# Patient Record
Sex: FEMALE | Race: WHITE | NOT HISPANIC OR LATINO | Employment: FULL TIME | URBAN - METROPOLITAN AREA
[De-identification: names, ages, dates, MRNs, and addresses within clinical notes are randomized per-mention and may not be internally consistent; named-entity substitution may affect disease eponyms.]

---

## 2019-08-05 ENCOUNTER — OFFICE VISIT (OUTPATIENT)
Dept: OBSTETRICS AND GYNECOLOGY | Facility: CLINIC | Age: 19
End: 2019-08-05

## 2019-08-05 ENCOUNTER — APPOINTMENT (OUTPATIENT)
Dept: LAB | Facility: HOSPITAL | Age: 19
End: 2019-08-05

## 2019-08-05 VITALS
DIASTOLIC BLOOD PRESSURE: 70 MMHG | WEIGHT: 106 LBS | BODY MASS INDEX: 19.51 KG/M2 | HEIGHT: 62 IN | SYSTOLIC BLOOD PRESSURE: 106 MMHG

## 2019-08-05 DIAGNOSIS — N92.6 IRREGULAR MENSES: ICD-10-CM

## 2019-08-05 DIAGNOSIS — Z01.419 ENCOUNTER FOR WELL WOMAN EXAM WITH ROUTINE GYNECOLOGICAL EXAM: Primary | ICD-10-CM

## 2019-08-05 DIAGNOSIS — R30.0 DYSURIA: ICD-10-CM

## 2019-08-05 DIAGNOSIS — Z30.432 ENCOUNTER FOR REMOVAL OF INTRAUTERINE CONTRACEPTIVE DEVICE (IUD): ICD-10-CM

## 2019-08-05 PROBLEM — L40.9 PSORIASIS: Status: ACTIVE | Noted: 2019-08-05

## 2019-08-05 LAB
BACTERIA UR QL AUTO: ABNORMAL /HPF
BILIRUB UR QL STRIP: NEGATIVE
CLARITY UR: ABNORMAL
COLOR UR: YELLOW
GLUCOSE UR STRIP-MCNC: NEGATIVE MG/DL
HGB UR QL STRIP.AUTO: ABNORMAL
HYALINE CASTS UR QL AUTO: ABNORMAL /LPF
KETONES UR QL STRIP: NEGATIVE
LEUKOCYTE ESTERASE UR QL STRIP.AUTO: ABNORMAL
NITRITE UR QL STRIP: NEGATIVE
PH UR STRIP.AUTO: 6 [PH] (ref 5–8)
PROT UR QL STRIP: ABNORMAL
RBC # UR: ABNORMAL /HPF
REF LAB TEST METHOD: ABNORMAL
SP GR UR STRIP: 1.02 (ref 1–1.03)
SQUAMOUS #/AREA URNS HPF: ABNORMAL /HPF
UROBILINOGEN UR QL STRIP: ABNORMAL
WBC UR QL AUTO: ABNORMAL /HPF

## 2019-08-05 PROCEDURE — 87210 SMEAR WET MOUNT SALINE/INK: CPT | Performed by: OBSTETRICS & GYNECOLOGY

## 2019-08-05 PROCEDURE — 81001 URINALYSIS AUTO W/SCOPE: CPT | Performed by: OBSTETRICS & GYNECOLOGY

## 2019-08-05 PROCEDURE — 58301 REMOVE INTRAUTERINE DEVICE: CPT | Performed by: OBSTETRICS & GYNECOLOGY

## 2019-08-05 PROCEDURE — 99385 PREV VISIT NEW AGE 18-39: CPT | Performed by: OBSTETRICS & GYNECOLOGY

## 2019-08-05 PROCEDURE — 83986 ASSAY PH BODY FLUID NOS: CPT | Performed by: OBSTETRICS & GYNECOLOGY

## 2019-08-05 RX ORDER — METRONIDAZOLE 500 MG/1
500 TABLET ORAL 2 TIMES DAILY
Qty: 14 TABLET | Refills: 0 | Status: SHIPPED | OUTPATIENT
Start: 2019-08-05 | End: 2019-08-12

## 2019-08-05 NOTE — PROGRESS NOTES
"Subjective   Chief Complaint   Patient presents with   • Annual Exam     desires IUD removed     Liyah Taveras is a 19 y.o. year old  presenting to be seen for her annual exam.    Current birth control method: IUD - Kyleena.  Placed 2018 she would like this removed because she is having a few things going on with this.  Her periods are irregular.  They only last about 2 days and about 3 weeks later they may bleed for couple of days and may be a couple weeks later.  She is always been irregular.  She may have had 4 to 5 days of flow in the past.  She also think she is having some urinary problems but she reports she is had bladder infections \"plenty of times\" currently having some burning itching and blood along with odor for the last couple weeks knows it to UTI but has not seen a doctor about this.    Patient's last menstrual period was 2019.    She is sexually active.   Condoms are not typically used.    [place] is painful or she is having problems :no  She has concerns about domestic violence: no.    Cycle Frequency: irregular   Menstrual cycle character: flow is typically light and flow is typically normal   Cycle Duration: 3 - 8   Number of heavy days of flows: 1   Intermenstrual bleeding present: yes   Post-coital bleeding present: no     She exercises regularly: yes.  Self breast awareness:not asked    Calcium intake: is adequate.3  Caffeine intake: no or mild caffeine use  Social History    Tobacco Use      Smoking status: Never Smoker      Smokeless tobacco: Never Used    Social History     Substance and Sexual Activity   Alcohol Use No   • Frequency: Never        The following portions of the patient's history were reviewed and updated as is  appropriate:She  has a past medical history of Anxiety, Asthma, and Psoriasis.  She does not have any pertinent problems on file.  She  has a past surgical history that includes [place] tooth extraction.  Her family history is not on " "file.  She  reports that she has never smoked. She has never used smokeless tobacco. She reports that she uses drugs. She reports that she does not drink alcohol.  She has No Known Allergies..    Current Outpatient Medications:   •  metroNIDAZOLE (FLAGYL) 500 MG tablet, Take 1 tablet by mouth 2 (Two) Times a Day for 7 days., Disp: 14 tablet, Rfl: 0    Review of Systems GYN questionnaire was negative x8.  No prior obstetrical events.  Medical history negative x6+ for depression/anxiety and asthma/COPD.  System review is negative x11+ for psoriasis.  Social history she is single does not smoke does drink about 3 alcoholic beverages per week and has used marijuana in the past.     Objective   /70   Ht 157.5 cm (62\")   Wt 48.1 kg (106 lb)   LMP 08/02/2019   Breastfeeding? No   BMI 19.39 kg/m²       General:  well developed; well nourished  no acute distress  appears stated age   Skin:  No suspicious lesions seen; psoriasis on bilateral knees   Thyroid:    Breasts:  Not performed.   Abdomen: soft, non-tender; no masses  no umbilical or inguinal hernias are present  no hepato-splenomegaly   Pelvis: Clinical staff was present for exam  External genitalia:  normal appearance of the external genitalia including Bartholin's and Stronach's glands.  :  urethral meatus normal;  Vaginal:  normal pink mucosa without prolapse or lesions. discharge present -  white and thick; pH = 6 wet prep done: clue cells are present; blood present -  small amount;  Cervix:  normal appearance. IUD string present - 3 cms in length;  Uterus:  normal size, shape and consistency.  Adnexa:  normal bimanual exam of the adnexa.  [place] odor noted.       IUD Removal    Date of procedure:  8/5/2019    Risks and benefits discussed? yes  All questions answered? yes  Consents given by The patient  Written consent obtained? yes  Reason for removal: Side effect: Spotting irregular cycles possible UTI    Local anesthesia used:  no    Procedure " documentation:    A speculum was placed in order to view the cervix.  A tenaculum did not need to be placed on the anterior cervical lip.  The IUD string was easily seen.  The string was grasped and the IUD was removed without difficulty.  The Kyleena was removed intact.    She tolerated the procedure without any significant difficulty.     Post procedure instructions: Liyah Taveras notified to call with heavy bleeding, fever or increasing pain. Use OTC Nsaid's as needed.    Follow up needed: As needed    This note was electronically signed.    Manuel Stringer M.D.  August 5, 2019      Lab Review   No data reviewed    Imaging  No data reviewed       Assessment     1. Fairly normal GYN examination with minimal side effects related to IUD.  I have tried to talk to patient and given this little more time as her urinary problems actually may have preceded the IUD, the vaginal odor may not be related to the IUD and her periods have been irregular previously and she was only having light flow.  She would like to get a birth control pills to regulate her cycles and I have asked her to set alarm on her smart phone.  2. Condoms for STD protection with any new partners.  May be difficult with current partner however given the diagnosis of BV I think it would be a good idea for her to insist to wear a condom to prevent recurrence.  3. Mild bacterial vaginosis  4. Symptoms of possible bladder infection           Plan     1. Follow-up STD screening  2. Flagyl 500 for BV; condom use  3. Sample [place] to start.  She would like to wait 2 months and not have intercourse in the interim.  4. 1000 mg calcium in divided doses ideally in diet; regular exercise  5. Annual examination or sooner as needed         New Medications Ordered This Visit   Medications   • metroNIDAZOLE (FLAGYL) 500 MG tablet     Sig: Take 1 tablet by mouth 2 (Two) Times a Day for 7 days.     Dispense:  14 tablet     Refill:  0     Orders Placed This Encounter    Procedures   • Chlamydia trachomatis, Neisseria gonorrhoeae, Trichomonas vaginalis, PCR - Swab, Vagina     Standing Status:   Future     Standing Expiration Date:   9/4/2019   • Urinalysis without microscopic (no culture) - Urine, Clean Catch   • Urinalysis, Microscopic Only - Urine, Clean Catch   • Urinalysis With Microscopic - Urine, Clean Catch           This note was electronically signed.    Manuel Stringer MD  8/5/2019

## 2019-08-06 ENCOUNTER — RESULTS ENCOUNTER (OUTPATIENT)
Dept: OBSTETRICS AND GYNECOLOGY | Facility: CLINIC | Age: 19
End: 2019-08-06

## 2019-08-06 DIAGNOSIS — Z01.419 ENCOUNTER FOR WELL WOMAN EXAM WITH ROUTINE GYNECOLOGICAL EXAM: ICD-10-CM

## 2019-08-06 DIAGNOSIS — R30.0 DYSURIA: Primary | ICD-10-CM

## 2019-08-06 PROBLEM — N30.00 ACUTE CYSTITIS WITHOUT HEMATURIA: Status: ACTIVE | Noted: 2019-08-06

## 2019-08-06 PROCEDURE — 87077 CULTURE AEROBIC IDENTIFY: CPT | Performed by: OBSTETRICS & GYNECOLOGY

## 2019-08-06 PROCEDURE — 87086 URINE CULTURE/COLONY COUNT: CPT | Performed by: OBSTETRICS & GYNECOLOGY

## 2019-08-06 PROCEDURE — 87186 SC STD MICRODIL/AGAR DIL: CPT | Performed by: OBSTETRICS & GYNECOLOGY

## 2019-08-06 RX ORDER — NITROFURANTOIN 25; 75 MG/1; MG/1
100 CAPSULE ORAL 2 TIMES DAILY
Qty: 14 CAPSULE | Refills: 0 | Status: SHIPPED | OUTPATIENT
Start: 2019-08-06 | End: 2019-08-13

## 2019-08-06 NOTE — PROGRESS NOTES
Please asked the lab to do a culture of her specimen.  Please let the patient know that I called in a week course of Macrobid for her.  Plenty of fluids as well.  Thank you

## 2019-08-08 LAB — BACTERIA SPEC AEROBE CULT: ABNORMAL
